# Patient Record
Sex: FEMALE | Race: WHITE | NOT HISPANIC OR LATINO | Employment: OTHER | URBAN - NONMETROPOLITAN AREA
[De-identification: names, ages, dates, MRNs, and addresses within clinical notes are randomized per-mention and may not be internally consistent; named-entity substitution may affect disease eponyms.]

---

## 2021-01-01 ENCOUNTER — LAB (OUTPATIENT)
Dept: LAB | Facility: HOSPITAL | Age: 76
End: 2021-01-01

## 2021-01-01 ENCOUNTER — INFUSION (OUTPATIENT)
Dept: ONCOLOGY | Facility: HOSPITAL | Age: 76
End: 2021-01-01

## 2021-01-01 ENCOUNTER — HOSPITAL ENCOUNTER (OUTPATIENT)
Dept: CT IMAGING | Facility: HOSPITAL | Age: 76
Discharge: HOME | End: 2021-10-29
Admitting: INTERNAL MEDICINE

## 2021-01-01 ENCOUNTER — TELEPHONE (OUTPATIENT)
Dept: ONCOLOGY | Facility: CLINIC | Age: 76
End: 2021-01-01

## 2021-01-01 ENCOUNTER — APPOINTMENT (OUTPATIENT)
Dept: CT IMAGING | Facility: HOSPITAL | Age: 76
End: 2021-01-01

## 2021-01-01 ENCOUNTER — HOSPITAL ENCOUNTER (OUTPATIENT)
Dept: CT IMAGING | Facility: HOSPITAL | Age: 76
Discharge: HOME | End: 2021-11-15
Admitting: INTERNAL MEDICINE

## 2021-01-01 ENCOUNTER — CONSULT (OUTPATIENT)
Dept: ONCOLOGY | Facility: CLINIC | Age: 76
End: 2021-01-01

## 2021-01-01 ENCOUNTER — PATIENT ROUNDING (BHMG ONLY) (OUTPATIENT)
Dept: VASCULAR SURGERY | Facility: CLINIC | Age: 76
End: 2021-01-01

## 2021-01-01 ENCOUNTER — OFFICE VISIT (OUTPATIENT)
Dept: ONCOLOGY | Facility: CLINIC | Age: 76
End: 2021-01-01

## 2021-01-01 ENCOUNTER — OFFICE VISIT (OUTPATIENT)
Dept: VASCULAR SURGERY | Facility: CLINIC | Age: 76
End: 2021-01-01

## 2021-01-01 ENCOUNTER — PATIENT ROUNDING (BHMG ONLY) (OUTPATIENT)
Dept: ONCOLOGY | Facility: CLINIC | Age: 76
End: 2021-01-01

## 2021-01-01 ENCOUNTER — HOSPITAL ENCOUNTER (OUTPATIENT)
Dept: GENERAL RADIOLOGY | Facility: HOSPITAL | Age: 76
Discharge: HOME | End: 2021-10-26
Admitting: INTERNAL MEDICINE

## 2021-01-01 VITALS
BODY MASS INDEX: 28.66 KG/M2 | WEIGHT: 172 LBS | SYSTOLIC BLOOD PRESSURE: 110 MMHG | HEART RATE: 68 BPM | HEIGHT: 65 IN | OXYGEN SATURATION: 98 % | DIASTOLIC BLOOD PRESSURE: 62 MMHG

## 2021-01-01 VITALS
WEIGHT: 172.4 LBS | TEMPERATURE: 99.4 F | OXYGEN SATURATION: 99 % | HEIGHT: 65 IN | BODY MASS INDEX: 28.72 KG/M2 | SYSTOLIC BLOOD PRESSURE: 136 MMHG | RESPIRATION RATE: 16 BRPM | HEART RATE: 86 BPM | DIASTOLIC BLOOD PRESSURE: 70 MMHG

## 2021-01-01 VITALS
BODY MASS INDEX: 27.16 KG/M2 | HEART RATE: 73 BPM | SYSTOLIC BLOOD PRESSURE: 103 MMHG | TEMPERATURE: 96.7 F | DIASTOLIC BLOOD PRESSURE: 48 MMHG | HEIGHT: 65 IN | WEIGHT: 163 LBS | RESPIRATION RATE: 16 BRPM | OXYGEN SATURATION: 100 %

## 2021-01-01 VITALS
SYSTOLIC BLOOD PRESSURE: 106 MMHG | DIASTOLIC BLOOD PRESSURE: 41 MMHG | TEMPERATURE: 98.1 F | RESPIRATION RATE: 16 BRPM | WEIGHT: 170 LBS | HEART RATE: 63 BPM | HEIGHT: 65 IN | OXYGEN SATURATION: 98 % | BODY MASS INDEX: 28.32 KG/M2

## 2021-01-01 VITALS
HEIGHT: 65 IN | DIASTOLIC BLOOD PRESSURE: 62 MMHG | WEIGHT: 167.7 LBS | RESPIRATION RATE: 16 BRPM | HEART RATE: 91 BPM | BODY MASS INDEX: 27.94 KG/M2 | OXYGEN SATURATION: 98 % | TEMPERATURE: 98.5 F | SYSTOLIC BLOOD PRESSURE: 122 MMHG

## 2021-01-01 DIAGNOSIS — C68.9 UROTHELIAL CANCER (HCC): Primary | ICD-10-CM

## 2021-01-01 DIAGNOSIS — Z95.828 PRESENCE OF IVC FILTER: ICD-10-CM

## 2021-01-01 DIAGNOSIS — C68.9 UROTHELIAL CANCER: Primary | ICD-10-CM

## 2021-01-01 DIAGNOSIS — C68.9 UROTHELIAL CANCER (HCC): ICD-10-CM

## 2021-01-01 DIAGNOSIS — D50.9 IRON DEFICIENCY ANEMIA, UNSPECIFIED IRON DEFICIENCY ANEMIA TYPE: Primary | ICD-10-CM

## 2021-01-01 DIAGNOSIS — R52 PAIN: ICD-10-CM

## 2021-01-01 DIAGNOSIS — Z86.718 HISTORY OF DVT OF LOWER EXTREMITY: Primary | ICD-10-CM

## 2021-01-01 LAB
ALBUMIN SERPL-MCNC: 3.3 G/DL (ref 3.5–5.2)
ALBUMIN SERPL-MCNC: 3.6 G/DL (ref 3.5–5.2)
ALBUMIN/GLOB SERPL: 0.8 G/DL
ALBUMIN/GLOB SERPL: 0.9 G/DL
ALP SERPL-CCNC: 90 U/L (ref 39–117)
ALP SERPL-CCNC: 95 U/L (ref 39–117)
ALT SERPL W P-5'-P-CCNC: 10 U/L (ref 1–33)
ALT SERPL W P-5'-P-CCNC: 10 U/L (ref 1–33)
ANION GAP SERPL CALCULATED.3IONS-SCNC: 11 MMOL/L (ref 5–15)
ANION GAP SERPL CALCULATED.3IONS-SCNC: 11 MMOL/L (ref 5–15)
AST SERPL-CCNC: 11 U/L (ref 1–32)
AST SERPL-CCNC: 15 U/L (ref 1–32)
BASOPHILS # BLD AUTO: 0.17 10*3/MM3 (ref 0–0.2)
BASOPHILS # BLD MANUAL: 0.56 10*3/MM3 (ref 0–0.2)
BASOPHILS NFR BLD AUTO: 0.5 % (ref 0–1.5)
BASOPHILS NFR BLD AUTO: 1 % (ref 0–1.5)
BILIRUB SERPL-MCNC: 0.2 MG/DL (ref 0–1.2)
BILIRUB SERPL-MCNC: 0.3 MG/DL (ref 0–1.2)
BUN SERPL-MCNC: 14 MG/DL (ref 8–23)
BUN SERPL-MCNC: 16 MG/DL (ref 8–23)
BUN/CREAT SERPL: 19.8 (ref 7–25)
BUN/CREAT SERPL: 20.3 (ref 7–25)
CALCIUM SPEC-SCNC: 9.2 MG/DL (ref 8.6–10.5)
CALCIUM SPEC-SCNC: 9.5 MG/DL (ref 8.6–10.5)
CALR EXON 9 MUT ANL BLD/T: NORMAL
CHLORIDE SERPL-SCNC: 100 MMOL/L (ref 98–107)
CHLORIDE SERPL-SCNC: 98 MMOL/L (ref 98–107)
CO2 SERPL-SCNC: 23 MMOL/L (ref 22–29)
CO2 SERPL-SCNC: 26 MMOL/L (ref 22–29)
CREAT SERPL-MCNC: 0.69 MG/DL (ref 0.57–1)
CREAT SERPL-MCNC: 0.81 MG/DL (ref 0.57–1)
DEPRECATED RDW RBC AUTO: 50.6 FL (ref 37–54)
DEPRECATED RDW RBC AUTO: 53.1 FL (ref 37–54)
DEPRECATED RDW RBC AUTO: 53.7 FL (ref 37–54)
EOSINOPHIL # BLD AUTO: 1.15 10*3/MM3 (ref 0–0.4)
EOSINOPHIL # BLD MANUAL: 1.58 10*3/MM3 (ref 0–0.4)
EOSINOPHIL NFR BLD AUTO: 3.4 % (ref 0.3–6.2)
EOSINOPHIL NFR BLD MANUAL: 4 % (ref 0.3–6.2)
ERYTHROCYTE [DISTWIDTH] IN BLOOD BY AUTOMATED COUNT: 15.7 % (ref 12.3–15.4)
ERYTHROCYTE [DISTWIDTH] IN BLOOD BY AUTOMATED COUNT: 16.5 % (ref 12.3–15.4)
ERYTHROCYTE [DISTWIDTH] IN BLOOD BY AUTOMATED COUNT: 16.5 % (ref 12.3–15.4)
FERRITIN SERPL-MCNC: 754 NG/ML (ref 13–150)
FOLATE SERPL-MCNC: 6.12 NG/ML (ref 4.78–24.2)
GFR SERPL CREATININE-BSD FRML MDRD: 69 ML/MIN/1.73
GFR SERPL CREATININE-BSD FRML MDRD: 83 ML/MIN/1.73
GLOBULIN UR ELPH-MCNC: 4 GM/DL
GLOBULIN UR ELPH-MCNC: 4.2 GM/DL
GLUCOSE SERPL-MCNC: 109 MG/DL (ref 65–99)
GLUCOSE SERPL-MCNC: 144 MG/DL (ref 65–99)
HCT VFR BLD AUTO: 24.4 % (ref 34–46.6)
HCT VFR BLD AUTO: 25.6 % (ref 34–46.6)
HCT VFR BLD AUTO: 27.1 % (ref 34–46.6)
HGB BLD-MCNC: 7.8 G/DL (ref 12–15.9)
HGB BLD-MCNC: 7.9 G/DL (ref 12–15.9)
HGB BLD-MCNC: 8.3 G/DL (ref 12–15.9)
HOLD SPECIMEN: NORMAL
HOLD SPECIMEN: NORMAL
HYPOCHROMIA BLD QL: ABNORMAL
IRON 24H UR-MRATE: 11 MCG/DL (ref 37–145)
IRON SATN MFR SERPL: 6 % (ref 20–50)
LYMPHOCYTES # BLD AUTO: 3.38 10*3/MM3 (ref 0.7–3.1)
LYMPHOCYTES # BLD MANUAL: 3.34 10*3/MM3 (ref 0.7–3.1)
LYMPHOCYTES # BLD MANUAL: 3.95 10*3/MM3 (ref 0.7–3.1)
LYMPHOCYTES NFR BLD AUTO: 10 % (ref 19.6–45.3)
LYMPHOCYTES NFR BLD MANUAL: 2 % (ref 5–12)
LYMPHOCYTES NFR BLD MANUAL: 3 % (ref 19.6–45.3)
LYMPHOCYTES NFR BLD MANUAL: 6 % (ref 5–12)
LYMPHOCYTES NFR BLD MANUAL: 9 % (ref 19.6–45.3)
MCH RBC QN AUTO: 27.1 PG (ref 26.6–33)
MCH RBC QN AUTO: 27.3 PG (ref 26.6–33)
MCH RBC QN AUTO: 28.2 PG (ref 26.6–33)
MCHC RBC AUTO-ENTMCNC: 30.6 G/DL (ref 31.5–35.7)
MCHC RBC AUTO-ENTMCNC: 30.9 G/DL (ref 31.5–35.7)
MCHC RBC AUTO-ENTMCNC: 32 G/DL (ref 31.5–35.7)
MCV RBC AUTO: 88.1 FL (ref 79–97)
MCV RBC AUTO: 88.6 FL (ref 79–97)
MCV RBC AUTO: 88.6 FL (ref 79–97)
MONOCYTES # BLD AUTO: 0.79 10*3/MM3 (ref 0.1–0.9)
MONOCYTES # BLD AUTO: 2.04 10*3/MM3 (ref 0.1–0.9)
MONOCYTES # BLD AUTO: 3.34 10*3/MM3 (ref 0.1–0.9)
MONOCYTES NFR BLD AUTO: 6 % (ref 5–12)
NEUTROPHILS # BLD AUTO: 33.17 10*3/MM3 (ref 1.7–7)
NEUTROPHILS # BLD AUTO: 48.37 10*3/MM3 (ref 1.7–7)
NEUTROPHILS NFR BLD AUTO: 26.67 10*3/MM3 (ref 1.7–7)
NEUTROPHILS NFR BLD AUTO: 78.8 % (ref 42.7–76)
NEUTROPHILS NFR BLD MANUAL: 82 % (ref 42.7–76)
NEUTROPHILS NFR BLD MANUAL: 86 % (ref 42.7–76)
NEUTS BAND NFR BLD MANUAL: 1 % (ref 0–5)
NEUTS BAND NFR BLD MANUAL: 2 % (ref 0–5)
PLATELET # BLD AUTO: 643 10*3/MM3 (ref 140–450)
PLATELET # BLD AUTO: 671 10*3/MM3 (ref 140–450)
PLATELET # BLD AUTO: 737 10*3/MM3 (ref 140–450)
PMV BLD AUTO: 8.9 FL (ref 6–12)
PMV BLD AUTO: 9.1 FL (ref 6–12)
PMV BLD AUTO: 9.2 FL (ref 6–12)
POLYCHROMASIA BLD QL SMEAR: ABNORMAL
POLYCHROMASIA BLD QL SMEAR: ABNORMAL
POTASSIUM SERPL-SCNC: 3.7 MMOL/L (ref 3.5–5.2)
POTASSIUM SERPL-SCNC: 3.9 MMOL/L (ref 3.5–5.2)
PROT SERPL-MCNC: 7.3 G/DL (ref 6–8.5)
PROT SERPL-MCNC: 7.8 G/DL (ref 6–8.5)
RBC # BLD AUTO: 2.77 10*6/MM3 (ref 3.77–5.28)
RBC # BLD AUTO: 2.89 10*6/MM3 (ref 3.77–5.28)
RBC # BLD AUTO: 3.06 10*6/MM3 (ref 3.77–5.28)
REF LAB TEST METHOD: NORMAL
SMALL PLATELETS BLD QL SMEAR: ABNORMAL
SMALL PLATELETS BLD QL SMEAR: ABNORMAL
SODIUM SERPL-SCNC: 134 MMOL/L (ref 136–145)
SODIUM SERPL-SCNC: 135 MMOL/L (ref 136–145)
TIBC SERPL-MCNC: 186 MCG/DL (ref 298–536)
TRANSFERRIN SERPL-MCNC: 125 MG/DL (ref 200–360)
VARIANT LYMPHS NFR BLD MANUAL: 1 % (ref 0–5)
VARIANT LYMPHS NFR BLD MANUAL: 3 % (ref 0–5)
VIT B12 BLD-MCNC: 639 PG/ML (ref 211–946)
WBC # BLD AUTO: 33.84 10*3/MM3 (ref 3.4–10.8)
WBC # BLD AUTO: 39.49 10*3/MM3 (ref 3.4–10.8)
WBC # BLD AUTO: 55.6 10*3/MM3 (ref 3.4–10.8)
WBC MORPH BLD: NORMAL
WBC MORPH BLD: NORMAL

## 2021-01-01 PROCEDURE — 36415 COLL VENOUS BLD VENIPUNCTURE: CPT

## 2021-01-01 PROCEDURE — 99204 OFFICE O/P NEW MOD 45 MIN: CPT | Performed by: SURGERY

## 2021-01-01 PROCEDURE — 25010000002 IOPAMIDOL 61 % SOLUTION: Performed by: INTERNAL MEDICINE

## 2021-01-01 PROCEDURE — 85007 BL SMEAR W/DIFF WBC COUNT: CPT

## 2021-01-01 PROCEDURE — 88184 FLOWCYTOMETRY/ TC 1 MARKER: CPT

## 2021-01-01 PROCEDURE — 25010000002 FERRIC CARBOXYMALTOSE 750 MG/15ML SOLUTION 15 ML VIAL: Performed by: INTERNAL MEDICINE

## 2021-01-01 PROCEDURE — 74177 CT ABD & PELVIS W/CONTRAST: CPT

## 2021-01-01 PROCEDURE — G2212 PROLONG OUTPT/OFFICE VIS: HCPCS | Performed by: INTERNAL MEDICINE

## 2021-01-01 PROCEDURE — 80053 COMPREHEN METABOLIC PANEL: CPT

## 2021-01-01 PROCEDURE — 99215 OFFICE O/P EST HI 40 MIN: CPT | Performed by: INTERNAL MEDICINE

## 2021-01-01 PROCEDURE — 82607 VITAMIN B-12: CPT

## 2021-01-01 PROCEDURE — 82728 ASSAY OF FERRITIN: CPT

## 2021-01-01 PROCEDURE — 85025 COMPLETE CBC W/AUTO DIFF WBC: CPT

## 2021-01-01 PROCEDURE — 84466 ASSAY OF TRANSFERRIN: CPT

## 2021-01-01 PROCEDURE — 99205 OFFICE O/P NEW HI 60 MIN: CPT | Performed by: INTERNAL MEDICINE

## 2021-01-01 PROCEDURE — 83540 ASSAY OF IRON: CPT

## 2021-01-01 PROCEDURE — 96365 THER/PROPH/DIAG IV INF INIT: CPT

## 2021-01-01 PROCEDURE — 82746 ASSAY OF FOLIC ACID SERUM: CPT

## 2021-01-01 PROCEDURE — 88185 FLOWCYTOMETRY/TC ADD-ON: CPT

## 2021-01-01 PROCEDURE — 72100 X-RAY EXAM L-S SPINE 2/3 VWS: CPT

## 2021-01-01 PROCEDURE — 71260 CT THORAX DX C+: CPT

## 2021-01-01 RX ORDER — TRAMADOL HYDROCHLORIDE 50 MG/1
50 TABLET ORAL EVERY 8 HOURS PRN
Qty: 90 TABLET | Refills: 0 | Status: SHIPPED | OUTPATIENT
Start: 2021-01-01 | End: 2021-12-04

## 2021-01-01 RX ORDER — DRONABINOL 5 MG/1
5 CAPSULE ORAL
Qty: 30 CAPSULE | Refills: 0 | Status: SHIPPED | OUTPATIENT
Start: 2021-01-01

## 2021-01-01 RX ORDER — SODIUM CHLORIDE 9 MG/ML
250 INJECTION, SOLUTION INTRAVENOUS ONCE
Status: CANCELLED | OUTPATIENT
Start: 2021-01-01

## 2021-01-01 RX ORDER — ONDANSETRON HYDROCHLORIDE 8 MG/1
8 TABLET, FILM COATED ORAL EVERY 8 HOURS PRN
Qty: 30 TABLET | Refills: 1 | Status: SHIPPED | OUTPATIENT
Start: 2021-01-01

## 2021-01-01 RX ORDER — DRONABINOL 5 MG/1
5 CAPSULE ORAL
Qty: 30 CAPSULE | Refills: 0 | Status: SHIPPED | OUTPATIENT
Start: 2021-01-01 | End: 2021-01-01 | Stop reason: SDUPTHER

## 2021-01-01 RX ORDER — DIPHENHYDRAMINE HYDROCHLORIDE 50 MG/ML
50 INJECTION INTRAMUSCULAR; INTRAVENOUS AS NEEDED
Status: CANCELLED | OUTPATIENT
Start: 2021-01-01

## 2021-01-01 RX ORDER — MEGESTROL ACETATE 40 MG/ML
400 SUSPENSION ORAL DAILY
Qty: 480 ML | Refills: 0 | Status: SHIPPED | OUTPATIENT
Start: 2021-01-01

## 2021-01-01 RX ORDER — PREDNISONE 50 MG/1
TABLET ORAL
Qty: 3 TABLET | Refills: 0 | Status: SHIPPED | OUTPATIENT
Start: 2021-01-01 | End: 2021-01-01

## 2021-01-01 RX ORDER — DIPHENHYDRAMINE HCL 50 MG
CAPSULE ORAL
Qty: 1 CAPSULE | Refills: 0 | Status: SHIPPED | OUTPATIENT
Start: 2021-01-01 | End: 2021-01-01

## 2021-01-01 RX ORDER — DIPHENHYDRAMINE HYDROCHLORIDE 50 MG/ML
50 INJECTION INTRAMUSCULAR; INTRAVENOUS AS NEEDED
Status: DISCONTINUED | OUTPATIENT
Start: 2021-01-01 | End: 2021-01-01 | Stop reason: HOSPADM

## 2021-01-01 RX ORDER — FAMOTIDINE 10 MG/ML
20 INJECTION, SOLUTION INTRAVENOUS AS NEEDED
Status: DISCONTINUED | OUTPATIENT
Start: 2021-01-01 | End: 2021-01-01 | Stop reason: HOSPADM

## 2021-01-01 RX ORDER — DIPHENHYDRAMINE HCL 50 MG
CAPSULE ORAL
Qty: 1 CAPSULE | Refills: 0 | Status: SHIPPED | OUTPATIENT
Start: 2021-01-01

## 2021-01-01 RX ORDER — HYDROCODONE BITARTRATE AND ACETAMINOPHEN 5; 325 MG/1; MG/1
1 TABLET ORAL EVERY 6 HOURS PRN
Qty: 40 TABLET | Refills: 0 | Status: SHIPPED | OUTPATIENT
Start: 2021-01-01

## 2021-01-01 RX ORDER — SODIUM CHLORIDE 9 MG/ML
250 INJECTION, SOLUTION INTRAVENOUS ONCE
Status: COMPLETED | OUTPATIENT
Start: 2021-01-01 | End: 2021-01-01

## 2021-01-01 RX ORDER — FAMOTIDINE 10 MG/ML
20 INJECTION, SOLUTION INTRAVENOUS AS NEEDED
Status: CANCELLED | OUTPATIENT
Start: 2021-01-01

## 2021-01-01 RX ORDER — ACETAMINOPHEN 325 MG/1
650 TABLET ORAL ONCE
Status: COMPLETED | OUTPATIENT
Start: 2021-01-01 | End: 2021-01-01

## 2021-01-01 RX ORDER — PREDNISONE 50 MG/1
TABLET ORAL
Qty: 3 TABLET | Refills: 0 | Status: SHIPPED | OUTPATIENT
Start: 2021-01-01

## 2021-01-01 RX ORDER — MEGESTROL ACETATE 40 MG/ML
400 SUSPENSION ORAL DAILY
Qty: 480 ML | Refills: 0 | Status: CANCELLED | OUTPATIENT
Start: 2021-01-01

## 2021-01-01 RX ORDER — AMOXICILLIN 250 MG
1 CAPSULE ORAL DAILY
COMMUNITY

## 2021-01-01 RX ADMIN — FERRIC CARBOXYMALTOSE INJECTION 750 MG: 50 INJECTION, SOLUTION INTRAVENOUS at 14:02

## 2021-01-01 RX ADMIN — IOPAMIDOL 100 ML: 612 INJECTION, SOLUTION INTRAVENOUS at 10:27

## 2021-01-01 RX ADMIN — SODIUM CHLORIDE 250 ML: 9 INJECTION, SOLUTION INTRAVENOUS at 13:25

## 2021-01-01 RX ADMIN — ACETAMINOPHEN 650 MG: 325 TABLET, FILM COATED ORAL at 13:52

## 2021-01-01 RX ADMIN — SODIUM CHLORIDE 250 ML: 9 INJECTION, SOLUTION INTRAVENOUS at 14:01

## 2021-01-01 RX ADMIN — FERRIC CARBOXYMALTOSE INJECTION 750 MG: 50 INJECTION, SOLUTION INTRAVENOUS at 13:35

## 2021-01-01 RX ADMIN — IOPAMIDOL 100 ML: 612 INJECTION, SOLUTION INTRAVENOUS at 15:56

## 2021-10-08 ENCOUNTER — APPOINTMENT (OUTPATIENT)
Dept: GENERAL RADIOLOGY | Age: 76
End: 2021-10-08
Payer: MEDICARE

## 2021-10-08 ENCOUNTER — HOSPITAL ENCOUNTER (EMERGENCY)
Age: 76
Discharge: HOME | End: 2021-10-08
Attending: EMERGENCY MEDICINE
Payer: MEDICARE

## 2021-10-08 VITALS
TEMPERATURE: 97.8 F | RESPIRATION RATE: 19 BRPM | OXYGEN SATURATION: 96 % | DIASTOLIC BLOOD PRESSURE: 71 MMHG | SYSTOLIC BLOOD PRESSURE: 111 MMHG | WEIGHT: 183 LBS | HEART RATE: 77 BPM

## 2021-10-08 DIAGNOSIS — D75.839 THROMBOCYTOSIS: ICD-10-CM

## 2021-10-08 DIAGNOSIS — D72.829 LEUKOCYTOSIS, UNSPECIFIED TYPE: Primary | ICD-10-CM

## 2021-10-08 DIAGNOSIS — N30.00 ACUTE CYSTITIS WITHOUT HEMATURIA: ICD-10-CM

## 2021-10-08 DIAGNOSIS — D64.9 ANEMIA, UNSPECIFIED TYPE: ICD-10-CM

## 2021-10-08 LAB
ALBUMIN SERPL-MCNC: 3.4 G/DL (ref 3.5–5.2)
ALP BLD-CCNC: 106 U/L (ref 35–104)
ALT SERPL-CCNC: 10 U/L (ref 5–33)
ANION GAP SERPL CALCULATED.3IONS-SCNC: 14 MMOL/L (ref 7–19)
AST SERPL-CCNC: 11 U/L (ref 5–32)
BACTERIA: ABNORMAL /HPF
BASOPHILS ABSOLUTE: 0.2 K/UL (ref 0–0.2)
BASOPHILS RELATIVE PERCENT: 0.6 % (ref 0–1)
BILIRUB SERPL-MCNC: 0.5 MG/DL (ref 0.2–1.2)
BILIRUBIN URINE: NEGATIVE
BLOOD, URINE: ABNORMAL
BUN BLDV-MCNC: 8 MG/DL (ref 8–23)
CALCIUM SERPL-MCNC: 8.9 MG/DL (ref 8.8–10.2)
CHLORIDE BLD-SCNC: 99 MMOL/L (ref 98–111)
CLARITY: ABNORMAL
CO2: 23 MMOL/L (ref 22–29)
COLOR: YELLOW
CREAT SERPL-MCNC: 0.6 MG/DL (ref 0.5–0.9)
EOSINOPHILS ABSOLUTE: 0.6 K/UL (ref 0–0.6)
EOSINOPHILS RELATIVE PERCENT: 1.9 % (ref 0–5)
EPITHELIAL CELLS, UA: ABNORMAL /HPF
FERRITIN: 823.2 NG/ML (ref 13–150)
FOLATE: 14.4 NG/ML (ref 4.8–37.3)
GFR AFRICAN AMERICAN: >59
GFR NON-AFRICAN AMERICAN: >60
GLUCOSE BLD-MCNC: 112 MG/DL (ref 74–109)
GLUCOSE URINE: NEGATIVE MG/DL
HCT VFR BLD CALC: 28.6 % (ref 37–47)
HEMOGLOBIN: 8.7 G/DL (ref 12–16)
IMMATURE GRANULOCYTES #: 0.5 K/UL
IRON SATURATION: 17 % (ref 14–50)
IRON: 26 UG/DL (ref 37–145)
KETONES, URINE: NEGATIVE MG/DL
LEUKOCYTE ESTERASE, URINE: ABNORMAL
LYMPHOCYTES ABSOLUTE: 3.7 K/UL (ref 1.1–4.5)
LYMPHOCYTES RELATIVE PERCENT: 12.4 % (ref 20–40)
MCH RBC QN AUTO: 27.4 PG (ref 27–31)
MCHC RBC AUTO-ENTMCNC: 30.4 G/DL (ref 33–37)
MCV RBC AUTO: 90.2 FL (ref 81–99)
MONOCYTES ABSOLUTE: 2 K/UL (ref 0–0.9)
MONOCYTES RELATIVE PERCENT: 6.6 % (ref 0–10)
NEUTROPHILS ABSOLUTE: 23.1 K/UL (ref 1.5–7.5)
NEUTROPHILS RELATIVE PERCENT: 76.7 % (ref 50–65)
NITRITE, URINE: NEGATIVE
PDW BLD-RTO: 17.2 % (ref 11.5–14.5)
PH UA: 7.5 (ref 5–8)
PLATELET # BLD: 818 K/UL (ref 130–400)
PMV BLD AUTO: 9.4 FL (ref 9.4–12.3)
POTASSIUM SERPL-SCNC: 3.4 MMOL/L (ref 3.5–5)
PROTEIN UA: 100 MG/DL
RBC # BLD: 3.17 M/UL (ref 4.2–5.4)
RBC UA: ABNORMAL /HPF (ref 0–2)
SODIUM BLD-SCNC: 136 MMOL/L (ref 136–145)
SPECIFIC GRAVITY UA: 1.01 (ref 1–1.03)
TOTAL IRON BINDING CAPACITY: 157 UG/DL (ref 250–400)
TOTAL PROTEIN: 7.6 G/DL (ref 6.6–8.7)
UROBILINOGEN, URINE: 0.2 E.U./DL
VITAMIN B-12: 971 PG/ML (ref 211–946)
WBC # BLD: 30.1 K/UL (ref 4.8–10.8)
WBC UA: ABNORMAL /HPF (ref 0–5)

## 2021-10-08 PROCEDURE — 6360000002 HC RX W HCPCS: Performed by: EMERGENCY MEDICINE

## 2021-10-08 PROCEDURE — 83550 IRON BINDING TEST: CPT

## 2021-10-08 PROCEDURE — 80053 COMPREHEN METABOLIC PANEL: CPT

## 2021-10-08 PROCEDURE — 82728 ASSAY OF FERRITIN: CPT

## 2021-10-08 PROCEDURE — 82746 ASSAY OF FOLIC ACID SERUM: CPT

## 2021-10-08 PROCEDURE — 2580000003 HC RX 258: Performed by: EMERGENCY MEDICINE

## 2021-10-08 PROCEDURE — 87086 URINE CULTURE/COLONY COUNT: CPT

## 2021-10-08 PROCEDURE — 81001 URINALYSIS AUTO W/SCOPE: CPT

## 2021-10-08 PROCEDURE — 85025 COMPLETE CBC W/AUTO DIFF WBC: CPT

## 2021-10-08 PROCEDURE — 99282 EMERGENCY DEPT VISIT SF MDM: CPT

## 2021-10-08 PROCEDURE — 83540 ASSAY OF IRON: CPT

## 2021-10-08 PROCEDURE — 82607 VITAMIN B-12: CPT

## 2021-10-08 PROCEDURE — 96374 THER/PROPH/DIAG INJ IV PUSH: CPT

## 2021-10-08 PROCEDURE — 71045 X-RAY EXAM CHEST 1 VIEW: CPT

## 2021-10-08 PROCEDURE — 36415 COLL VENOUS BLD VENIPUNCTURE: CPT

## 2021-10-08 RX ORDER — PANTOPRAZOLE SODIUM 40 MG/1
40 TABLET, DELAYED RELEASE ORAL
Qty: 180 TABLET | Refills: 1 | Status: SHIPPED | OUTPATIENT
Start: 2021-10-08

## 2021-10-08 RX ORDER — CEPHALEXIN 500 MG/1
500 CAPSULE ORAL 3 TIMES DAILY
Qty: 21 CAPSULE | Refills: 0 | Status: SHIPPED | OUTPATIENT
Start: 2021-10-08 | End: 2021-10-15

## 2021-10-08 RX ADMIN — WATER 1000 MG: 1 INJECTION INTRAMUSCULAR; INTRAVENOUS; SUBCUTANEOUS at 14:12

## 2021-10-08 ASSESSMENT — ENCOUNTER SYMPTOMS
ABDOMINAL PAIN: 0
EYE PAIN: 0
SHORTNESS OF BREATH: 0
DIARRHEA: 0
COUGH: 0
VOICE CHANGE: 0
RHINORRHEA: 0
VOMITING: 0
EYE REDNESS: 0

## 2021-10-08 ASSESSMENT — PAIN SCALES - GENERAL: PAINLEVEL_OUTOF10: 4

## 2021-10-08 ASSESSMENT — PAIN DESCRIPTION - LOCATION: LOCATION: HIP;LEG

## 2021-10-08 NOTE — ED PROVIDER NOTES
[facility] EMERGENCY DEPT  EMERGENCY DEPARTMENT ENCOUNTER      Pt Name: Kapil Tejada  MRN: [id number]  [place] 1945  Date of evaluation: 10/8/2021  Provider: Lyudmila Friend MD    [address] [place] [address]       Chief Complaint   Patient presents with    Abnormal Lab     low WBC new dx of bladder 2 weeks ago         HISTORY OF PRESENT ILLNESS   (Location/Symptom, Timing/Onset,Context/Setting, Quality, Duration, Modifying Factors, Severity)  Note limiting factors. Kapil Tejada is a 70 y.o. female who presents to the emergency department for evaluation after outpatient labs performed yesterday showed abnormal white blood cell count. Patient recently moved here from [place]. She was recently diagnosed with bladder cancer and states she was told there was concern she might have bleeding in her stomach but they did not find any other work-up for that as she was getting ready to move down here to live with her nephew. She denies any specific symptoms other than some generalized weakness recently. Had any fevers, vomiting, diarrhea, dysuria, cough or specific symptoms. Patient reports she has had chronic anemia since childhood was told recently that her blood counts look good. Patient is initially from [facility] but has lived in [place] for the last 55 years. States she moved here to live with family. HPI    NursingNotes were reviewed. REVIEW OF SYSTEMS    (2-9 systems for level 4, 10 or more for level 5)     Review of Systems   Constitutional: Positive for fatigue. Negative for fever. HENT: Negative for congestion, rhinorrhea and voice change. Eyes: Negative for pain and redness. Respiratory: Negative for cough and shortness of breath. Cardiovascular: Negative for chest pain. Gastrointestinal: Negative for abdominal pain, diarrhea and vomiting. Endocrine: Negative. Genitourinary: Negative. Musculoskeletal: Negative for arthralgias and gait problem. Skin: Negative for rash and wound. Neurological: Positive for weakness. Negative for headaches. Hematological: Negative. Psychiatric/Behavioral: Negative. All other systems reviewed and are negative. A complete review of systems was performed and is negative except as noted above in the HPI. PAST MEDICAL HISTORY   No past medical history on file. SURGICAL HISTORY     No past surgical history on file. CURRENT MEDICATIONS       Previous Medications    No medications on file       ALLERGIES     Contrast [iodides]    FAMILY HISTORY     No family history on file. SOCIAL HISTORY       Social History     Socioeconomic History    Marital status:      Spouse name: Not on file    Number of children: Not on file    Years of education: Not on file    Highest education level: Not on file   Occupational History    Not on file   Tobacco Use    Smoking status: Not on file   Substance and Sexual Activity    Alcohol use: Not on file    Drug use: Not on file    Sexual activity: Not on file   Other Topics Concern    Not on file   Social History Narrative    Not on file     Social Determinants of Health     Financial Resource Strain:     Difficulty of Paying Living Expenses:    Food Insecurity:     Worried About Running Out of Food in the Last Year:     [address] in the Last Year:    Transportation Needs:     Lack of Transportation (Medical):      Lack of Transportation (Non-Medical):    Physical Activity:     Days of Exercise per Week:     Minutes of Exercise per Session:    Stress:     Feeling of Stress :    Social Connections:     Frequency of Communication with Friends and Family:     Frequency of Social Gatherings with Friends and Family:     Attends [employer] Services:     Active Member of Clubs or Organizations:     Attends Club or Organization Meetings:     Marital Status:    Intimate Partner Violence:     Fear of Current or Ex-Partner:     Emotionally Abused:     Physically Abused:     Sexually Abused:        SCREENINGS             PHYSICAL EXAM    (up to 7 for level 4, 8 or more for level 5)     ED Triage Vitals   BP Temp Temp src Pulse Resp SpO2 Height Weight   10/08/21 1002 10/08/21 1016 -- 10/08/21 1002 10/08/21 1002 10/08/21 1002 -- 10/08/21 1016   127/60 97.8 °F (36.6 °C)  76 25 94 %  183 lb (83 kg)       Physical Exam  Vitals and nursing note reviewed. Constitutional:       General: She is not in acute distress. Appearance: Normal appearance. She is well-developed. She is not diaphoretic. HENT:      Head: Normocephalic and atraumatic. Mouth/Throat:      Pharynx: No oropharyngeal exudate. Eyes:      General: No scleral icterus. Pupils: Pupils are equal, round, and reactive to light. Neck:      Trachea: No tracheal deviation. Cardiovascular:      Rate and Rhythm: Normal rate. Pulses: Normal pulses. Heart sounds: Normal heart sounds. Pulmonary:      Effort: Pulmonary effort is normal.      Breath sounds: Normal breath sounds. No stridor. No wheezing or rhonchi. Abdominal:      General: There is no distension. Palpations: Abdomen is soft. Abdomen is not rigid. Tenderness: There is no abdominal tenderness. There is no guarding. Hernia: No hernia is present. Musculoskeletal:         General: No deformity. Cervical back: Normal range of motion. Skin:     General: Skin is warm and dry. Findings: No rash. Neurological:      Mental Status: She is alert and oriented to person, place, and time. Cranial Nerves: No cranial nerve deficit.       Coordination: Coordination normal.   Psychiatric:         Behavior: Behavior normal.         DIAGNOSTIC RESULTS     EKG: All EKG's are interpreted by the Emergency Department Physician who either signs or Co-signs this chart in the absence of a cardiologist.    *  RADIOLOGY:   Non-plain film images such as CT, Ultrasound and MRI are read by the radiologist. Plainradiographic images are visualized and preliminarily interpreted by the emergency physician with the below findings:      Interpretation per the Radiologist below, if available at the time of this note:    XR CHEST PORTABLE    (Results Pending)         ED BEDSIDE ULTRASOUND:   Performed by ED Physician - none    LABS:  Labs Reviewed   CBC WITH AUTO DIFFERENTIAL - Abnormal; Notable for the following components:       Result Value    WBC 30.1 (*)     RBC 3.17 (*)     Hemoglobin 8.7 (*)     Hematocrit 28.6 (*)     MCHC 30.4 (*)     RDW 17.2 (*)     Platelets 879 (*)     Neutrophils % 76.7 (*)     Lymphocytes % 12.4 (*)     Neutrophils Absolute 23.1 (*)     Monocytes Absolute 2.00 (*)     All other components within normal limits   COMPREHENSIVE METABOLIC PANEL - Abnormal; Notable for the following components:    Potassium 3.4 (*)     Glucose 112 (*)     Albumin 3.4 (*)     Alkaline Phosphatase 106 (*)     All other components within normal limits   URINE RT REFLEX TO CULTURE - Abnormal; Notable for the following components:    Clarity, UA CLOUDY (*)     Blood, Urine LARGE (*)     Protein,  (*)     Leukocyte Esterase, Urine LARGE (*)     All other components within normal limits   MICROSCOPIC URINALYSIS - Abnormal; Notable for the following components:    WBC, UA TNTC (*)     RBC, UA 11-15 (*)     Bacteria, UA 1+ (*)     All other components within normal limits   CULTURE, URINE   IRON AND TIBC   FERRITIN   VITAMIN B12 & FOLATE       All other labs were within normal range or not returned as of this dictation.     Medications   cefTRIAXone (ROCEPHIN) 1,000 mg in sterile water 10 mL IV syringe (has no administration in time range)       EMERGENCY DEPARTMENT COURSE and DIFFERENTIALDIAGNOSIS/MDM:   Vitals:    Vitals:    10/08/21 1016 10/08/21 1031 10/08/21 1101 10/08/21 1123   BP: 127/60 127/65 (!) 133/117 133/60   Pulse: 76 73 80 75   Resp: 18 21 22 20   Temp: 97.8 °F (36.6 °C)      SpO2: 99% 94% 95% 98%   Weight: 183 lb (83 kg) [facility]   EKG shows sinus rhythm rate of 63. No evidence of acute ischemia or infarction. Normal intervals. Overall normal EKG. ED Course as of Oct 08 1419   Fri Oct 08, 2021   1219 WBC(!): 30.1 [DARIAN]   1219 Bacteria, UA(!): 1+ [DARIAN]   1219 Leukocyte Esterase, Urine(!): LARGE [DARIAN]   1412 Hemoglobin Quant(!): 8.7 [DARIAN]   1413 Hematocrit(!): 28.6 [DARIAN]      ED Course User Index  [DARIAN] Stephie Viera MD       Labs show normocytic anemia with hemoglobin of 8.7 and hematocrit of 28.6. White blood cell count is elevated at 30. Patient has mild bacteriuria with hematuria. May represent urinary tract infection. .  No evidence of other underlying source of infection to explain patient's elevated white blood cell count. May be related to recent bladder cancer diagnosis. Platelet count elevated which may be reactive. Patient is stable and without any other specific or concerning symptoms. she is agreeable to discharge with initiation of antibiotics for bacteriuria with referrals to hematology/oncology urology. Evaluation and work-up here revealed no signs of emergent or life-threatening pathology that would necessitate admission for further work-up or management at this time. Patient is felt to be stable for discharge home with return precautions for worsening of the condition or development of new concerning symptoms. Patient was encouraged to follow-up with their primary care doctor in the appropriate timeframe. Necessary prescriptions and information have been provided for treatment at home. Patient voices understanding and agreement with the plan. CONSULTS:  None    PROCEDURES:  Unless otherwise notedbelow, none     Procedures      FINAL IMPRESSION     1. Leukocytosis, unspecified type    2. Acute cystitis without hematuria    3. Anemia, unspecified type    4.  Thrombocytosis          DISPOSITION/PLAN   DISPOSITION Decision To Discharge 10/08/2021 02:09:14 PM      PATIENT REFERRED TO:  [facility] EMERGENCY [facility]  [phone]    If symptoms worsen    Sandro Frazier MD  [address] [place] [address]  [phone]    Schedule an appointment as soon as possible for a visit       Royce Bermudez MD  [address] [place], [place] [id number] [phone]    Schedule an appointment as soon as possible for a visit       Mine Cross MD  [address] [place] [address] Dr [facility] [address] [facility] [address]    Schedule an appointment as soon as possible for a visit         DISCHARGE MEDICATIONS:  New Prescriptions    CEPHALEXIN (KEFLEX) 500 MG CAPSULE    Take 1 capsule by mouth 3 times daily for 7 days    PANTOPRAZOLE (PROTONIX) 40 MG TABLET    Take 1 tablet by mouth 2 times daily (before meals)          (Please note that portions of this note were completed with a voice recognition program.  Efforts were made to edit the dictations butoccasionally words are mis-transcribed.)    Gilford Hopping, MD (electronically signed)  AttendingEmerSt. Bernards Medical Center Physician          Gilford Hopping., MD  10/08/21 136 Stefania Chow MD  10/08/21 [phone]

## 2021-10-10 LAB — URINE CULTURE, ROUTINE: NORMAL

## 2021-10-12 NOTE — PROGRESS NOTES
[facility] GROUP HEMATOLOGY AND ONCOLOGY  [address]  [facility] [address] [phone]    Patient Name: Jacinta Rodriguez  Encounter Date: 10/18/2021  YOB: 1945  Patient Number: [id number]        REASON FOR CONSULTATION: Newly diagnosed invasive high-grade urothelial carcinoma with extensive squamous differentiation.    HISTORY OF PRESENT ILLNESS: Jacinta Rodriguez is a 75-year-old female who recently moved here from [facility].  She is originally from [facility] but has lived in [place] for the past 55 years.  She has moved to this area to be with family.  She has a history of anemia that she states she has had since childhood.  Otherwise she denies any chronic medical illnesses and was not on any maintenance meds.    However, more recently she has been diagnosed with bladder cancer with bleeding.      --09/28/2021-TURBT done at [facility].  Microscopic diagnosis: Invasive high-grade urothelial carcinoma with extensive squamous differentiation.  Tumor site: Right bladder wall.  Histologic type: Urothelial carcinoma with extensive squamous differentiation.  Histologic grade: High-grade.  Muscularis propria presence: No muscularis propria (detrusor muscle identified).  Microscopic tumor Extension: Tumor invades lamina propria (subepithelial connective tissue).  Lymphovascular invasion: No definitive lymphovascular invasion identified.  Predicted pathologic staging (pTNM, AJCC eighth edition): pT1.    --10/08/2021 presented to [facility] ED with generalized weakness but no other specific symptoms, specifically no fevers, no vomiting, no diarrhea, no dysuria, no cough.  Labs: WBC 30,000 with 76 segs (ANC 23.1), 12 lymphs, absolute monocyte count of 2, otherwise normal.  Hemoglobin 8.7, hematocrit 28.6, MCV 90.2, platelets 818,000.  Iron 26, iron saturation 17%, ferritin 823, CMP notable for potassium 3.4, glucose 112,  albumin 3.4, alk phos 106 otherwise normal.  Urinalysis showed cloudy urine, large blood, WBC TNTC, RBC 11-15, bacteria, 1+.  Leukocyte esterase large.    Patient was discharged on oral antibiotics with outpatient oncology and urology follow-up.    I have reviewed the HPI and verified with the patient the accuracy of it. No changes to interval history since the information was documented. Frank Fraser MD 10/18/21     LABS    Lab Results - Last 18 Months   Lab Units 10/08/21  1112   HEMOGLOBIN g/dL 8.7*   HEMATOCRIT % 28.6*   MCV fL 90.2   WBC K/uL 30.1*   RDW % 17.2*   MPV fL 9.4   PLATELETS K/uL 818*   NEUTROS ABS K/uL 23.1*   LYMPHS ABS K/uL 3.7   MONOS ABS K/uL 2.00*   EOS ABS K/uL 0.60   BASOS ABS K/uL 0.20   IMMATURE GRANS (ABS) K/uL 0.5       Lab Results - Last 18 Months   Lab Units 10/08/21  1112   GLUCOSE mg/dL 112*   SODIUM mmol/L 136   POTASSIUM mmol/L 3.4*   TOTAL CO2 mmol/L 23   CHLORIDE mmol/L 99   ANION GAP mmol/L 14   CREATININE mg/dL 0.6   BUN mg/dL 8   CALCIUM mg/dL 8.9   EGFR IF NONAFRICN AM  >60   ALK PHOS U/L 106*   TOTAL PROTEIN g/dL 7.6   ALT (SGPT) U/L 10   AST (SGOT) U/L 11   BILIRUBIN mg/dL 0.5   ALBUMIN g/dL 3.4*       No results for input(s): MSPIKE, KAPPALAMB, IGLFLC, URICACID, FREEKAPPAL, CEA, LDH, REFLABREPO in the last 00236 hours.    Lab Results - Last 18 Months   Lab Units 10/08/21  1112   IRON ug/dL 26*   TIBC ug/dL 157*   IRON SATURATION % 17   FERRITIN ng/mL 823.2*   FOLATE ng/mL 14.4         PAST MEDICAL HISTORY:  ALLERGIES:  Allergies   Allergen Reactions   • Iodides Other (See Comments)     Family history of allergy has never received it herself      CURRENT MEDICATIONS:  Outpatient Encounter Medications as of 10/18/2021   Medication Sig Dispense Refill   • apixaban (ELIQUIS) 5 MG tablet tablet Take 5 mg by mouth 2 (Two) Times a Day.       No facility-administered encounter medications on file as of 10/18/2021.   Adult illnesses:  Anemia, NOS  Bladder cancer    Past  surgeries:  Hysterectomy (does not recall if she had an oophorectomy)  Bilateral tubal ligation  D&C years ago  Bilateral cataract surgeries    ADULT ILLNESSES:  There is no problem list on file for this patient.    SURGERIES:  History reviewed. No pertinent surgical history.  HEALTH MAINTENANCE ITEMS:  Health Maintenance Due   Topic Date Due   • MAMMOGRAM  Never done   • DXA SCAN  Never done   • COLORECTAL CANCER SCREENING  Never done   • COVID-19 Vaccine (1) Never done   • TDAP/TD VACCINES (1 - Tdap) Never done   • ZOSTER VACCINE (1 of 2) Never done   • Pneumococcal Vaccine 65+ (1 of 1 - PPSV23) Never done   • INFLUENZA VACCINE  Never done   • HEPATITIS C SCREENING  Never done   • ANNUAL WELLNESS VISIT  Never done       <no information>  Last Completed Colonoscopy     This patient has no relevant Health Maintenance data.          There is no immunization history on file for this patient.  Last Completed Mammogram     This patient has no relevant Health Maintenance data.            FAMILY HISTORY:  History reviewed. No pertinent family history.  SOCIAL HISTORY:  Social History     Socioeconomic History   • Marital status:    Tobacco Use   • Smoking status: Former Smoker   • Smokeless tobacco: Never Used   Substance and Sexual Activity   • Alcohol use: Never   • Drug use: Never   • Sexual activity: Defer       REVIEW OF SYSTEMS:  Review of Systems   Constitutional: Positive for activity change, appetite change, fatigue and unexpected weight loss (Has lost 30 pounds in the past month).        Barely manages her personal ADLs, unable to help with chores, errands and has not been driving.  Spends less than 50% up and about.   HENT: Negative.    Eyes: Negative.    Respiratory: Negative.    Cardiovascular: Negative.    Gastrointestinal: Negative.    Endocrine: Negative.    Genitourinary: Positive for dysuria, hematuria and urinary incontinence (wears depends).        Nocturia   Musculoskeletal: Positive for  "arthralgias (hips/leg on the right), back pain and myalgias (Nocturnal leg cramps and pain in calves when ambulating).   Skin: Negative.    Allergic/Immunologic: Negative.    Neurological: Positive for weakness (Generalized).   Hematological: Negative.    Psychiatric/Behavioral: Negative.  Negative for depressed mood. The patient is not nervous/anxious.        /70   Pulse 86   Temp 99.4 °F (37.4 °C)   Resp 16   Ht 165.1 cm (65\")   Wt 78.2 kg (172 lb 6.4 oz)   SpO2 99%   Breastfeeding No   BMI 28.69 kg/m²  Body surface area is 1.86 meters squared.  Pain Score    10/18/21 1352   PainSc: 10-Worst pain ever       Physical Exam:  Physical Exam  Vitals reviewed.   Constitutional:       Comments: Pleasant, cooperative, chronically ill-appearing, modestly kept, heavyset elderly female.  Brought in by wheelchair.  ECOG 2.  She is accompanied by her nephew, Carlos Eduardo TERAN:      Head: Atraumatic.      Comments: Subtle bitemporal wasting is noted     Mouth/Throat:      Comments: She is wearing surgical mask today  Eyes:      General: No scleral icterus.     Extraocular Movements: Extraocular movements intact.      Conjunctiva/sclera: Conjunctivae normal.      Pupils: Pupils are equal, round, and reactive to light.   Cardiovascular:      Rate and Rhythm: Normal rate.   Pulmonary:      Effort: Pulmonary effort is normal.   Chest:   Breasts:      Right: No axillary adenopathy or supraclavicular adenopathy.      Left: No axillary adenopathy or supraclavicular adenopathy.       Abdominal:      Palpations: Abdomen is soft.      Tenderness: There is no abdominal tenderness.   Musculoskeletal:         General: Swelling present. Normal range of motion.      Cervical back: Normal range of motion.      Right lower leg: Edema (1+ ankle) present.      Left lower leg: Edema (1+, ankle) present.   Lymphadenopathy:      Cervical: No cervical adenopathy.      Right cervical: No superficial or deep cervical adenopathy.     Left " cervical: No superficial or deep cervical adenopathy.      Upper Body:      Right upper body: No supraclavicular or axillary adenopathy.      Left upper body: No supraclavicular or axillary adenopathy.      Lower Body: No right inguinal adenopathy. No left inguinal adenopathy.   Skin:     General: Skin is warm.      Findings: No lesion.   Neurological:      General: No focal deficit present.      Mental Status: She is alert and oriented to person, place, and time.      Cranial Nerves: No cranial nerve deficit.   Psychiatric:         Mood and Affect: Mood normal.         Behavior: Behavior normal.         Thought Content: Thought content normal.           Assessment:  1.   Invasive high-grade urothelial carcinoma with extensive squamous differentiation of the bladder  --Original tumor stage: At least AJCC 1 (pT1, Nx, Mx)  --Original tumor burden: Tumor right bladder wall  --Complications of tumor: Anemia, reactive neutrophilic leukocytosis, reactive thrombocytosis, hematuria, pyuria/UTI  --Risk stratification: Very high risk features (variant histology).  --Tumor status:  --09/28/2021-TURBT:  Microscopic diagnosis: Invasive high-grade urothelial carcinoma with extensive squamous differentiation.  Tumor site: Right bladder wall.  Histologic type: Urothelial carcinoma with extensive squamous differentiation.  Histologic grade: High-grade.  Muscularis propria presence: No muscularis propria (detrusor muscle identified).  Microscopic tumor Extension: Tumor invades lamina propria (subepithelial connective tissue).  Lymphovascular invasion: No definitive lymphovascular invasion identified.  Predicted pathologic staging (pTNM, AJCC eighth edition): pT1.    2.  Normocytic anemia with evidence for iron deficiency and anemia of malignancy/anemia of chronic disease  --Hgb 8.7; MCV 90.2, 10/08/2021  3.  Neutrophilic leukocytosis.  Likely reactive to #1  4.  Thrombocytosis.  Likely reactive to #2  --818,000, 10/08/2021  5.  Poor  appetite and weight loss  6.  Urinary incontinence    Plan:  1.   Apprised of the (limited) diagnostic information.  2.   Draw CBC with differential, CMP, iron, iron saturation, ferritin, B12, folate, JAK2 mutation, CALR, BCR/ABL1 rearrangement.  3.   Schedule Injectafer 750 mg IV x1 at [facility]  4.   Schedule baseline CT chest, abdomen/pelvis with p.o. and IV contrast at [facility]-patient has iodine allergy blood test previously tolerated IV contrast with premeds-add premeal protocol.  5.   Appoint to urology Re: Assess bladder cancer for cystectomy vs BCG.  6.   Review NCCN guidelines version 4.2021 bladder cancer: Initial evaluation-office cystoscopy, abdominal/pelvic imaging, including imaging of upper urinary tract collecting system before TURBT.  Screen for smoking; primary evaluation/surgical treatment--EUA; TURBT on single dose intravesical chemotherapy within 24 hours of TURBT (gemcitabine preferred); presumptive clinical stage--nonmuscle invasive bladder cancer (NMIBC)-cT1-high-grade NMIBC-initial management: With high risk/very high risk features--cystectomy (preferred for very high risk features) or BCG (category 1, preferred for no very high risk features).  Follow-up: Year 1--cystoscopy months 3 and 12.  Baseline upper tract and abdominal pelvic imaging; year 2-5-annual cystoscopy.  Imaging as clinically indicated.  Year 5->10-cystoscopy and imaging as clinically indicated.  7.   Continue management per primary care and other specialists    8.   Rx:  Megace 40 mg/mL - 10 mL daily # 30 days                  9.   Return to office in 6 weeks with preoffice CBC and differential, CMP, iron, iron saturation, ferritin.    I spent 75 total minutes, face-to-face, caring for Jacinta today.  Greater than 50% of this time involved counseling and/or coordination of care as documented within this note regarding the patient's illness(es), pros and cons of various treatment options, instructions and/or risk reduction.

## 2021-10-15 ENCOUNTER — TELEPHONE (OUTPATIENT)
Dept: UROLOGY | Age: 76
End: 2021-10-15

## 2021-10-15 NOTE — TELEPHONE ENCOUNTER
Called today to see if she wants to make a er follow up appt and she stated she was feeling better and felt it was not necessary.

## 2021-10-18 PROBLEM — C68.9 UROTHELIAL CANCER: Status: ACTIVE | Noted: 2021-01-01

## 2021-10-18 NOTE — TELEPHONE ENCOUNTER
CRITICAL LAB VALUE:  Received call from Nadia  hematology with CRITICAL LAB VALUE:    WBC: 33.84  This information was sent to Dr Fraser for review and to address.

## 2021-10-19 PROBLEM — D50.9 IRON DEFICIENCY ANEMIA: Status: ACTIVE | Noted: 2021-01-01

## 2021-10-19 NOTE — TELEPHONE ENCOUNTER
Spoke with the patient's niece Carlos Eduardo and let her know that we were going to send in a new script for Marinol.

## 2021-10-19 NOTE — TELEPHONE ENCOUNTER
Provider: CONCEPCION   Caller: BEVERLY TORRES  Relationship to Patient: NÉSTOR     Phone Number: [phone]  Reason for Call: PT NEWPHEW STATES THAT THE SCRIPT GIVEN FOR PT TO HAVE AN APPETITE HAS BEEN DENIED,   MEGESTROL 40 MG , NEEDS TO KNOW WHAT HE CAN DO TO GET IT FILLED OR IF PT CAN GET SOMETHING ELSE, PT STATES PHARMACY SENT IT BACK TO THE OFFICE YESTERDAY

## 2021-10-19 NOTE — TELEPHONE ENCOUNTER
I called and spoke with the nephew, Carlos Eduardo. I let him know that I had the prescription for the hospital bed. He will either pick it up or call me to let me know which durable medical pharmacy to fax it to.

## 2021-10-21 NOTE — TELEPHONE ENCOUNTER
[address] [facility] office called in advising patient is wanting to schedule HFU she changed her mind and does feel it is necessary. If possible she would like an appointment 10.28.21 or the first week of November.  Please contact her Ramon Fairly back with appointment information [phone]  Thank you

## 2021-10-22 NOTE — PATIENT INSTRUCTIONS
"BMI for Adults  What is BMI?  Body mass index (BMI) is a number that is calculated from a person's weight and height. BMI can help estimate how much of a person's weight is composed of fat. BMI does not measure body fat directly. Rather, it is an alternative to procedures that directly measure body fat, which can be difficult and expensive.  BMI can help identify people who may be at higher risk for certain medical problems.  What are BMI measurements used for?  BMI is used as a screening tool to identify possible weight problems. It helps determine whether a person is obese, overweight, a healthy weight, or underweight.  BMI is useful for:  · Identifying a weight problem that may be related to a medical condition or may increase the risk for medical problems.  · Promoting changes, such as changes in diet and exercise, to help reach a healthy weight. BMI screening can be repeated to see if these changes are working.  How is BMI calculated?  BMI involves measuring your weight in relation to your height. Both height and weight are measured, and the BMI is calculated from those numbers. This can be done either in English (U.S.) or metric measurements. Note that charts and online BMI calculators are available to help you find your BMI quickly and easily without having to do these calculations yourself.  To calculate your BMI in English (U.S.) measurements:    1. Measure your weight in pounds (lb).  2. Multiply the number of pounds by 703.  ? For example, for a person who weighs 180 lb, multiply that number by 703, which equals 126,540.  3. Measure your height in inches. Then multiply that number by itself to get a measurement called \"inches squared.\"  ? For example, for a person who is 70 inches tall, the \"inches squared\" measurement is 70 inches x 70 inches, which equals 4,900 inches squared.  4. Divide the total from step 2 (number of lb x 703) by the total from step 3 (inches squared): 126,540 ÷ 4,900 = 25.8. This is " "your BMI.    To calculate your BMI in metric measurements:  1. Measure your weight in kilograms (kg).  2. Measure your height in meters (m). Then multiply that number by itself to get a measurement called \"meters squared.\"  ? For example, for a person who is 1.75 m tall, the \"meters squared\" measurement is 1.75 m x 1.75 m, which is equal to 3.1 meters squared.  3. Divide the number of kilograms (your weight) by the meters squared number. In this example: 70 ÷ 3.1 = 22.6. This is your BMI.  What do the results mean?  BMI charts are used to identify whether you are underweight, normal weight, overweight, or obese. The following guidelines will be used:  · Underweight: BMI less than 18.5.  · Normal weight: BMI between 18.5 and 24.9.  · Overweight: BMI between 25 and 29.9.  · Obese: BMI of 30 or above.  Keep these notes in mind:  · Weight includes both fat and muscle, so someone with a muscular build, such as an athlete, may have a BMI that is higher than 24.9. In cases like these, BMI is not an accurate measure of body fat.  · To determine if excess body fat is the cause of a BMI of 25 or higher, further assessments may need to be done by a health care provider.  · BMI is usually interpreted in the same way for men and women.  Where to find more information  For more information about BMI, including tools to quickly calculate your BMI, go to these websites:  · Centers for Disease Control and Prevention: www.cdc.gov  · American Heart Association: www.heart.org  · National Heart, Lung, and Blood Brady: www.nhlbi.nih.gov  Summary  · Body mass index (BMI) is a number that is calculated from a person's weight and height.  · BMI may help estimate how much of a person's weight is composed of fat. BMI can help identify those who may be at higher risk for certain medical problems.  · BMI can be measured using English measurements or metric measurements.  · BMI charts are used to identify whether you are underweight, normal " weight, overweight, or obese.  This information is not intended to replace advice given to you by your health care provider. Make sure you discuss any questions you have with your health care provider.  Document Revised: 09/09/2020 Document Reviewed: 07/17/2020  Elsevier Patient Education © 2021 Elsevier Inc.

## 2021-10-22 NOTE — PROGRESS NOTES
October 22, 2021    Hello, may I speak with Jacinta Rodriguez?    My name is MARIA E WAGGONER      I am  with [facility]  [address], [address]  [place] [address] [phone].    Before we get started may I verify your date of birth? 1945    I am calling to officially welcome you to our practice and ask about your recent visit. Is this a good time to talk? yes    Tell me about your visit with us. What things went well?  EVERYTHING WAS WONDERFUL       We're always looking for ways to make our patients' experiences even better. Do you have recommendations on ways we may improve?  no    Overall were you satisfied with your first visit to our practice? yes       I appreciate you taking the time to speak with me today. Is there anything else I can do for you? no      Thank you, and have a great day.

## 2021-10-22 NOTE — PROGRESS NOTES
October 22, 2021    Hello, may I speak with Jacinta Rodriguez?    My name is Tiffanie Arroyo.      I am  with [facility]  [address]  [place] [address] [phone].    Before we get started may I verify your date of birth? 1945    I am calling to officially welcome you to our practice and ask about your recent visit. Is this a good time to talk? No Left voicemail

## 2021-10-22 NOTE — PROGRESS NOTES
10/22/2021      No referring provider defined for this encounter.    Jacinta Rodriguez  1945    Chief Complaint   Patient presents with   • Establish Care     Referred over by Dr Maranda Chavarria for IVC Filter Removal. Patient denies any stroke like symptoms.    • Former Smoker     Patient is a FOrmer Smoker    • other     Patient states IVC Filter was placed in [place] at a Hospital in 09/2021. Dr Garrick Monreal, [phone] and [phone]   • Med Management     Verbally verified medications with patient/son        Dear No ref. provider found:      HPI  I had the pleasure of seeing your patient Jacinta Rodriguez in the office today.  Thank you kindly for this consultation.  As you recall, Jacinta Rodriguez is a 75 y.o.  female who you are currently following for general medical care.  She just recently moved to the area after living in [place] about 50 years.  She is originally from [facility].  She had noted some recent weight loss while living up in [place] and ultimately was found to have a bladder mass.  As per the patient while in [place] in [place] she had a biopsy of that bladder mass but I am unsure of the pathology but from what they describe it is malignant.  Also at that time apparently she was found to have right lower extremity DVT and so an IVC filter was placed.  Since that time she has also been placed on Eliquis and is taking 5 mg twice daily.  She has now relocated here to [place] to be closer to family and so is establishing care.  She does not have any records with her from her recent hospitalization and my office is working on requesting those.  She is accompanied by her nephew today.  She is here to discuss possible IVC filter removal she was told that it would eventually need to be removed.  It was placed about 1 month ago.  In the office today she notes some bilateral lower extremity edema which is chronic.  She denies any specific leg pain with no claudication or ischemic rest pain.  She  otherwise has no other significant complaints today.    Past Medical History:   Diagnosis Date   • Bladder cancer (HCC)        Past Surgical History:   Procedure Laterality Date   • OTHER SURGICAL HISTORY  09/2021    IVC Filter Placement        History reviewed. No pertinent family history.    Social History     Socioeconomic History   • Marital status:    Tobacco Use   • Smoking status: Former Smoker   • Smokeless tobacco: Never Used   Substance and Sexual Activity   • Alcohol use: Never   • Drug use: Never   • Sexual activity: Defer       Allergies   Allergen Reactions   • Iodides Other (See Comments)     Family history of allergy has never received it herself        Prior to Admission medications    Medication Sig Start Date End Date Taking? Authorizing Provider   apixaban (ELIQUIS) 5 MG tablet tablet Take 5 mg by mouth 2 (Two) Times a Day.   Yes ProviderNeela MD   dronabinol (Marinol) 5 MG capsule Take 1 capsule by mouth 2 (Two) Times a Day Before Meals. 10/19/21  Yes Frank Fraser MD   megestrol (MEGACE) 40 MG/ML suspension Take 10 mL by mouth Daily. 10/18/21  Yes Frank Fraser MD   diphenhydrAMINE (BENADRYL) 50 MG capsule Take 1 hour before CT scan with last dose of Prednisone 10/18/21 10/22/21  Frank Fraser MD   predniSONE (DELTASONE) 50 MG tablet Take 1 tablet 13 hours, 7 hours, and 1 hour before CT scan. 10/18/21 10/22/21  Frank Fraser MD       Review of Systems   Constitutional: Negative.  Negative for activity change, appetite change, chills, diaphoresis, fatigue and fever.   HENT: Negative.  Negative for congestion, sneezing, sore throat and trouble swallowing.    Eyes: Negative.  Negative for visual disturbance.   Respiratory: Negative.  Negative for chest tightness and shortness of breath.    Cardiovascular: Positive for leg swelling. Negative for chest pain and palpitations.   Gastrointestinal: Negative.  Negative for abdominal distention, abdominal pain,  "nausea and vomiting.   Endocrine: Negative.    Genitourinary: Negative.    Musculoskeletal: Negative.    Skin: Negative.    Allergic/Immunologic: Negative.    Neurological: Negative.    Hematological: Negative.    Psychiatric/Behavioral: Negative.        /62 (BP Location: Left arm, Patient Position: Sitting, Cuff Size: Adult)   Pulse 68   Ht 165.1 cm (65\")   Wt 78 kg (172 lb)   SpO2 98%   BMI 28.62 kg/m²   Physical Exam  Vitals reviewed.   Constitutional:       Appearance: Normal appearance.   HENT:      Head: Normocephalic and atraumatic.      Nose: Nose normal.      Mouth/Throat:      Mouth: Mucous membranes are moist.   Eyes:      Extraocular Movements: Extraocular movements intact.      Pupils: Pupils are equal, round, and reactive to light.   Cardiovascular:      Rate and Rhythm: Normal rate and regular rhythm.      Pulses: Normal pulses.           Carotid pulses are 2+ on the right side and 2+ on the left side.       Radial pulses are 2+ on the right side and 2+ on the left side.        Femoral pulses are 2+ on the right side and 2+ on the left side.       Popliteal pulses are 2+ on the right side and 2+ on the left side.        Dorsalis pedis pulses are 2+ on the right side and 2+ on the left side.        Posterior tibial pulses are 2+ on the right side and 2+ on the left side.      Comments: She is edema of the bilateral lower extremities from ankle to knee.  She has palpable pulses throughout the bilateral lower extremities and both feet are warm and appear well-perfused.  Pulmonary:      Effort: Pulmonary effort is normal. No respiratory distress.   Abdominal:      General: There is no distension.      Palpations: Abdomen is soft. There is no mass.      Tenderness: There is no abdominal tenderness.   Musculoskeletal:         General: Normal range of motion.      Cervical back: Normal range of motion and neck supple.      Right lower leg: Edema present.      Left lower leg: Edema present. "   Skin:     General: Skin is warm and dry.      Capillary Refill: Capillary refill takes less than 2 seconds.   Neurological:      General: No focal deficit present.      Mental Status: She is alert and oriented to person, place, and time.   Psychiatric:         Mood and Affect: Mood normal.         Behavior: Behavior normal.         Thought Content: Thought content normal.         Judgment: Judgment normal.         No results found.    Patient Active Problem List   Diagnosis   • Urothelial cancer (HCC)   • Iron deficiency anemia         ICD-10-CM ICD-9-CM   1. History of DVT of lower extremity  Z86.718 V12.51   2. Presence of IVC filter  Z95.828 V45.89   3. Urothelial cancer (HCC)  C68.9 189.8       Lab Frequency Next Occurrence   CBC & Differential Once 11/16/2021   Comprehensive Metabolic Panel Once 11/16/2021   Iron Once 11/16/2021   Transferrin Saturation Once 11/16/2021   Ferritin Once 11/16/2021   CT chest w contrast Once 10/23/2021   CT abdomen pelvis w contrast Once 10/23/2021       Plan: After thoroughly evaluating Jacinta Rodriguez, I believe the best course of action is to initially remain conservative from a vascular standpoint.  She presents today after recent move to [place] from [facility] where she was found to have a bladder tumor status post biopsy.  She was also found to have DVT at that time and had placement of an IVC filter.  She is also noted to be currently on Eliquis 5 mg twice daily given the previous DVT.  At this point in discussing with her and her nephew the extent of her bladder cancer is unknown at this point.  I do not have access to the pathology report or any other reports from her hospital in [place] and so I have requested records through my office.  She does have upcoming evaluation with our oncology group here next month and has an upcoming CT of the abdomen/pelvis for further evaluation next week.  As such I would not proceed with any filter removal at this point because  we are unsure of what further treatment she may need for her bladder cancer.  There is a strong possibility if she needs further biopsy or any surgical intervention that she may need to be off anticoagulation and given her previous history of DVT the filter in place may be a good thing.  As such once I reviewed her old records to see what can a filter was placed and then see what oncology as planned for her bladder mass then we can make further recommendations.  For now we will leave the filter in place and she can continue on her anticoagulation.  I will see her back here in the office in 3 months and we can further review and make recommendations at that time.  The patient can continue taking their current medication regimen as previously planned.    Thank you for allowing me to participate in the care of your patient.  Please do not hesitate with any questions or concerns.  I will keep you aware of any further encounters with Jacinta Rodriguez.        Sincerely yours,         Singh Armas MD

## 2021-10-25 NOTE — TELEPHONE ENCOUNTER
PAIN  Received call from Maddy Rivera [facility]. She calls to report while making her in home visit with patient Jacinta Rodriguez this am, patient reports that she is experiencing increased pain in her lower back area which radiates down into her right thigh/leg area, most noted with movement. She rates her pain a 8 on Pain Scale.  She has a few Norco tablets that she has had to take to help get her pain under control this prescription [place]. She now lives here locally. She says that the Norco makes her feel very loopy and does not like the way that it makes her feel.   Patient is requesting something for her pain but possibly not a narcotic. Please advise     Patient was seen in Initial Consult 10/18/21 for: INVASIVE HIGH GRADE UROTHELIAL CARCINOMA

## 2021-10-25 NOTE — TELEPHONE ENCOUNTER
Call from patient's nephew Carlos Eduardo to report that the patient is having a CT scan this week and they are requesting a follow up with Dr. Fraser next week after the CT scan. Call given to Opal to schedule.

## 2021-10-26 NOTE — TELEPHONE ENCOUNTER
10/26/21: call placed and message was left for Maddy BAILEY Nurse regarding patient Jacinta Rodriguez, requesting call back to discuss patient issues.

## 2021-10-26 NOTE — PROGRESS NOTES
1310 Spoke with Kathy Willoughby LPN patient had iron studies drawn on 10/18/21 was repeated today. Wondering if needed repeated. Kathy reviewed chart does not need repeated. Pretty in lab aware to cancel.Sneha CURRAN

## 2021-10-26 NOTE — TELEPHONE ENCOUNTER
10/26/21 @ 9:06: Received call back from Maddy  Nurse she was informed that, per Dr Fraser recommendations, patient Jacinta Rodriguez needed to have X-rays to try and locate pain and discomfort of her lumbar spine area. Orders have been placed and patient can walk into the [facility] or [facility]. She v/u

## 2021-11-02 NOTE — TELEPHONE ENCOUNTER
Caller: BEVERLY TORRES    Relationship: Emergency Contact    Best call back number: [phone]    What is the best time to reach you: ANYTIME    Who are you requesting to speak with (clinical staff, provider,  specific staff member): CLINICAL    What was the call regarding: BEVERLY IS CALLING BACK REGARDING THE MESSAGE HE LEFT EARLIER AND HE ALSO WANTED TO KNOW IF FRANCISCO'S  AND HIMSELF (BEVERLY) COULD BE AT THE APPOINTMENT TOMORROW WITH THE DOCTOR. FRANCISCO IS SCARED AND WOULD LIKE FOR THEM TO BE WITH HER.     Do you require a callback: YES

## 2021-11-02 NOTE — TELEPHONE ENCOUNTER
"PAIN  Received call from patient nephew/caregiver Carlos Eduardo Rodriguez. He calls to report that his Aunt/patient Jacinta Rodriguez is in tremendous pain C/O pain most noted right side/hip radiates down her right leg area, rates the discomfort 8-10. Says she magdiel most of the time due to the pain she is experiencing. She has no appetite and will not eat.   She has tried Oxycodone in the past with no results and did not like the way it made her feel, reports she was experiencing lots of falls, doing things that was totally out of character for her. Patient d/c use of the medications \"said its not helping me with my pain control, and Im going to get hurt using this\". She is currently using Tylenol, positioning, and heat with little results. Patient is also up every 30 minutes at nite going to the bathroom feeling as though she has to urinate.  Nephew says it is heartbreaking his Aunt is completely miserable requesting something to help with pain relief and help her rest/something for sleep.  Shan's/Helder Hernadez    NOTE:   Patient does have apt for f/u tomorrow Wed 11/3/21 for review of her recent scans.   "

## 2021-11-03 NOTE — TELEPHONE ENCOUNTER
Caller:  CHARLES     Relationship:  [facility]     Best call back number: [phone]    What is the best time to reach you: ANYTIME    Who are you requesting to speak with (clinical staff, provider,  specific staff member):  DR LUU'S NURSE    Do you know the name of the person who called: MORE PERES     What was the call regarding:  SAW FRANCISCO YESTERDAY, SHE WAS HAVING INCREASE PAIN DUE TO RIGHT HIP, AND ALSO HER  NEPHEW WANTED TO SEE IF ANYTHING COULD BE ORDERED LIKE A GEL OVERLAY MATTRESS FOR FRANCISCO?    WOULD LIKE IF NURSE WOULD CALL AND FOLLOW UP WITH CHARLES TODAY REGARDING PATIENT CARE AFTER HER FOLLOW UP TODAY WITH DR LUU  KNOWS THEY HAD TALKED ABOUT POSSIBLE HOSPICE CARE.       Do you require a callback: YES

## 2021-11-04 NOTE — TELEPHONE ENCOUNTER
I called and spoke with the patient's nephew Carlos Eduardo. They had asked about switching her pain medication to Tramadol 50 mg. Jacinta has taken that before and she was not able to sleep well with the Norco. Jacinta would like to continue the Marinol for appetite.

## 2021-11-04 NOTE — TELEPHONE ENCOUNTER
I called and spoke with Carlos Eduardo. He had asked Negin to have me call. He believes that Jacinta has an unrealistic expectation of Chemo. I explained to Carlos Eduardo that Jacinta has stage IV cancer. Her cancer is advanced and incurable. I let Carlos Eduardo know that I had spoken with Dr. Fraser and that Dr. Fraser believes in a more conservative approach for the patient. Dr. Fraser fears that the patient will not be able to tolerate the chemo well and that it could cause more damage to the patient than the cancer. I explained to Carlos Eduardo that a conservative approach would be comfort/hospice care. Carlos Eduardo verbalized understanding and will talk with Jacinta this afternoon.

## 2021-11-09 NOTE — TELEPHONE ENCOUNTER
Provider: CONCEPCION   Caller: BEVERLY  Relationship to Patient: NEPHEW     Phone Number: [phone]  Reason for Call:     PT GOT CALL FOR CT SCAN ON THE 19TH OF November, PT IS ALLERGIC TO THE CONTRAST, PT NEEDS MEDS TO COMPLETE THIS, SHE NEEDS SAME MEDS THAT WAS CALLED IN BEFORE .  SEND MEDICATION TO [facility] IN [facility]

## 2021-11-09 NOTE — TELEPHONE ENCOUNTER
Spoke with Maddy and she feels that the patient is a good candidate for Hospice. She has called them to just to go and speak with the patient and family. They just need the order if the patient does decide for hospice. Maddy recommends [facility] for the hospital bed. Faxed order to [phone]

## 2021-11-09 NOTE — TELEPHONE ENCOUNTER
Provider: CONCEPCION   Caller: BEVERLY  Relationship to Patient: NÉSTOR     Phone Number: [phone]  Reason for Call: PT IS WANTING TO HOLD OFF ON THE CHEMO TEACH APT TOMORROW 11/10/2021 UNTIL SHE GETS A SECOND OPINION   BUT WANTS TO KEEP THE INFUSION APT ON 11/10/2021 @ 1:00 PM

## 2021-11-09 NOTE — TELEPHONE ENCOUNTER
CALLER: CHARLES    Relationship: HOME HEALTH    Best call back number: [phone]    What is the best time to reach you: ANYTIME     Who are you requesting to speak with (clinical staff, provider,  specific staff member): DR. LUU OR NURSE    What was the call regarding: HOSPICE HAS REQUESTED THAT AN ORDER BE PLACED IN CASE IT IS DETERMINED THAT PATIENT WILL REQUIRE THEIR CARE.    IN ADDITION, AN ORDER FOR A HOSPITAL BED HAS BEEN PLACED BUT CHARLES WOULD LIKE TO KNOW IF THIS CAN BE FAXED TO WHEREVER WE NORMALLY FAX IT?     Do you require a callback: ONLY FOR ADDITIONAL QUESTIONS

## 2021-11-10 NOTE — TELEPHONE ENCOUNTER
Caller: BEVERLY TORRES    Relationship: Emergency Contact    Best call back number: [phone]    What is the best time to reach you: ANYTIME    Who are you requesting to speak with (clinical staff, provider,  specific staff member): DR LUU OR NURSE    Do you know the name of the person who called: PTS NEPHEW CALLED. THEY WOULD LIKE TO HAVE PTS SCAN FOR 11/19 MOVED UP SO THAT THEY HAVE THE RESULTS TO TAKE WITH THEM TO PTS 2ND OPINION IN [place] ON 11/18 WITH DR OJ BILL.     PLS CONTACT PTS NEPHEW TO CHANGE THIS SCAN.

## 2021-11-16 NOTE — TELEPHONE ENCOUNTER
Received call from [facility] Nurse @ [facility], she calls to report patient Jacinta Rodriguez remains enrolled in Home Health Services and has not decided yet on Hospice Services.   Nurse reports patient has bi-lateral lower extremity 4+ pitting edema. Patient does not have diuretics prescribed.   Patient c/o pain and is unsure how she should be taking her pain medications.  Norco was written first she then was prescribed Tramadol and is uncertain what she needs to be taking?    Please advise

## 2021-11-16 NOTE — TELEPHONE ENCOUNTER
Caller: BEVERLY TORRES    Relationship: Emergency Contact    Best call back number: [phone]    What is the best time to reach you: ANYTIME    Who are you requesting to speak with (clinical staff, provider,  specific staff member): CLINICAL    Do you know the name of the person who called:     What was the call regarding: VERIFYING MEDICAL RECORDS SENT TO [facility] WITH RECENT SCANS FOR Thursday 11/18/21    Do you require a callback:YES

## 2021-11-17 NOTE — TELEPHONE ENCOUNTER
Notified pt contact. After Dr. SANDOVAL reviewed her CT abdomen he would like to refer her to nephrology and have a CT of Chest done as well. He v/u and is aware that he will be contacted about scheduling these in the next few days.

## 2021-11-17 NOTE — TELEPHONE ENCOUNTER
----- Message from Frank Fraser MD sent at 11/17/2021 10:12 AM CST -----  Yes, my mistake. Thank u  ----- Message -----  From: Miguel Durand CMA  Sent: 11/17/2021   9:48 AM CST  To: Frank Fraser MD    Nephrology?    ----- Message -----  From: Frank Fraser MD  Sent: 11/15/2021   8:01 PM CST  To: w [facility]    Schedule CT chest with contrast  Please confirm she has an appointment with neurology to assess the hydronephrosis (now bilateral) and need for stents  Thank you

## 2021-11-17 NOTE — TELEPHONE ENCOUNTER
Caller: BEVERLY TORRES    Relationship: Emergency Contact    Best call back number: [phone]    What is the best time to reach you: ANYTIME - LEAVE VM IF NO ANSWER     Who are you requesting to speak with (clinical staff, provider,  specific staff member): NURSE     What was the call regarding: NEPHEW CALLED STATING THAT HE HAD JUST MISSED A CALL FROM OUR OFFICE.  UNABLE TO FIND NOTE ON CHART AND NEPHEW STATES NO VM WAS LEFT.      Do you require a callback: YES

## 2021-11-17 NOTE — TELEPHONE ENCOUNTER
11/17/21: return call to Home Health Nurse [facility], she was informed per Dr Fraser instructions, patient Jacinta Yanes needs to f/u with her PCP regarding bi-lateral swelling of lower extremities with 4+ pitting Edema, Nurse v/u and will inform patient and family of these instructions.

## 2021-11-19 NOTE — TELEPHONE ENCOUNTER
Provider: DR CONCEPCION    Caller: BEVERLY    Relationship to Patient: NEPHEW    Reason for Call: BEVERLY IS CALLING STATES THAT HOSPICE HAS BEEN CALLED IN AND SHE IS NOT GOING TO BE STARTING CHEMO.    IF ANY QUESTIONS PLEASE CALL BEVERLY

## 2021-12-08 ENCOUNTER — APPOINTMENT (OUTPATIENT)
Dept: LAB | Facility: HOSPITAL | Age: 76
End: 2021-12-08